# Patient Record
Sex: MALE | Race: WHITE | NOT HISPANIC OR LATINO | URBAN - METROPOLITAN AREA
[De-identification: names, ages, dates, MRNs, and addresses within clinical notes are randomized per-mention and may not be internally consistent; named-entity substitution may affect disease eponyms.]

---

## 2018-06-27 PROBLEM — Z00.00 ENCOUNTER FOR PREVENTIVE HEALTH EXAMINATION: Status: ACTIVE | Noted: 2018-06-27

## 2018-08-20 ENCOUNTER — OUTPATIENT (OUTPATIENT)
Dept: OUTPATIENT SERVICES | Facility: HOSPITAL | Age: 63
LOS: 1 days | End: 2018-08-20
Payer: COMMERCIAL

## 2018-08-20 ENCOUNTER — APPOINTMENT (OUTPATIENT)
Dept: NUCLEAR MEDICINE | Facility: HOSPITAL | Age: 63
End: 2018-08-20
Payer: COMMERCIAL

## 2018-08-20 DIAGNOSIS — C73 MALIGNANT NEOPLASM OF THYROID GLAND: ICD-10-CM

## 2018-08-20 PROCEDURE — 99201 OFFICE OUTPATIENT NEW 10 MINUTES: CPT

## 2018-08-21 ENCOUNTER — APPOINTMENT (OUTPATIENT)
Dept: NUCLEAR MEDICINE | Facility: HOSPITAL | Age: 63
End: 2018-08-21

## 2018-08-22 ENCOUNTER — APPOINTMENT (OUTPATIENT)
Dept: NUCLEAR MEDICINE | Facility: HOSPITAL | Age: 63
End: 2018-08-22

## 2018-08-24 ENCOUNTER — APPOINTMENT (OUTPATIENT)
Dept: NUCLEAR MEDICINE | Facility: HOSPITAL | Age: 63
End: 2018-08-24

## 2018-08-24 PROCEDURE — A9528: CPT

## 2018-08-24 PROCEDURE — 78018 THYROID MET IMAGING BODY: CPT

## 2018-08-24 PROCEDURE — 78999 UNLISTED MISC PX DX NUC MED: CPT

## 2018-08-24 PROCEDURE — 78020 THYROID MET UPTAKE: CPT | Mod: 26

## 2018-08-24 PROCEDURE — 96372 THER/PROPH/DIAG INJ SC/IM: CPT

## 2018-08-24 PROCEDURE — 78803 RP LOCLZJ TUM SPECT 1 AREA: CPT | Mod: 26

## 2018-08-24 PROCEDURE — 78020 THYROID MET UPTAKE: CPT

## 2018-08-24 PROCEDURE — 78018 THYROID MET IMAGING BODY: CPT | Mod: 26

## 2018-08-28 ENCOUNTER — OUTPATIENT (OUTPATIENT)
Dept: OUTPATIENT SERVICES | Facility: HOSPITAL | Age: 63
LOS: 1 days | End: 2018-08-28
Payer: COMMERCIAL

## 2018-08-28 ENCOUNTER — APPOINTMENT (OUTPATIENT)
Dept: NUCLEAR MEDICINE | Facility: HOSPITAL | Age: 63
End: 2018-08-28
Payer: COMMERCIAL

## 2018-08-28 DIAGNOSIS — C73 MALIGNANT NEOPLASM OF THYROID GLAND: ICD-10-CM

## 2018-08-29 ENCOUNTER — APPOINTMENT (OUTPATIENT)
Dept: NUCLEAR MEDICINE | Facility: HOSPITAL | Age: 63
End: 2018-08-29

## 2018-08-30 ENCOUNTER — APPOINTMENT (OUTPATIENT)
Dept: NUCLEAR MEDICINE | Facility: HOSPITAL | Age: 63
End: 2018-08-30

## 2018-08-30 PROCEDURE — 79005 NUCLEAR RX ORAL ADMIN: CPT | Mod: 26

## 2018-09-06 ENCOUNTER — APPOINTMENT (OUTPATIENT)
Dept: NUCLEAR MEDICINE | Facility: HOSPITAL | Age: 63
End: 2018-09-06

## 2018-09-06 PROCEDURE — A9517: CPT

## 2018-09-06 PROCEDURE — 96372 THER/PROPH/DIAG INJ SC/IM: CPT

## 2018-09-06 PROCEDURE — 78018 THYROID MET IMAGING BODY: CPT

## 2018-09-06 PROCEDURE — 79005 NUCLEAR RX ORAL ADMIN: CPT

## 2018-09-06 PROCEDURE — 78018 THYROID MET IMAGING BODY: CPT | Mod: 26

## 2023-08-10 ENCOUNTER — NON-APPOINTMENT (OUTPATIENT)
Age: 68
End: 2023-08-10

## 2023-08-25 ENCOUNTER — APPOINTMENT (OUTPATIENT)
Dept: OTOLARYNGOLOGY | Facility: CLINIC | Age: 68
End: 2023-08-25
Payer: MEDICARE

## 2023-08-25 ENCOUNTER — TRANSCRIPTION ENCOUNTER (OUTPATIENT)
Age: 68
End: 2023-08-25

## 2023-08-25 VITALS
DIASTOLIC BLOOD PRESSURE: 69 MMHG | OXYGEN SATURATION: 95 % | BODY MASS INDEX: 28.98 KG/M2 | HEART RATE: 87 BPM | WEIGHT: 207 LBS | HEIGHT: 71 IN | SYSTOLIC BLOOD PRESSURE: 155 MMHG

## 2023-08-25 DIAGNOSIS — E03.9 HYPOTHYROIDISM, UNSPECIFIED: ICD-10-CM

## 2023-08-25 DIAGNOSIS — Z87.09 PERSONAL HISTORY OF OTHER DISEASES OF THE RESPIRATORY SYSTEM: ICD-10-CM

## 2023-08-25 DIAGNOSIS — Z86.39 PERSONAL HISTORY OF OTHER ENDOCRINE, NUTRITIONAL AND METABOLIC DISEASE: ICD-10-CM

## 2023-08-25 DIAGNOSIS — F17.200 NICOTINE DEPENDENCE, UNSPECIFIED, UNCOMPLICATED: ICD-10-CM

## 2023-08-25 PROCEDURE — 31579 LARYNGOSCOPY TELESCOPIC: CPT

## 2023-08-25 PROCEDURE — 99205 OFFICE O/P NEW HI 60 MIN: CPT | Mod: 25

## 2023-08-25 RX ORDER — METOPROLOL TARTRATE 75 MG/1
TABLET, FILM COATED ORAL
Refills: 0 | Status: ACTIVE | COMMUNITY

## 2023-08-25 RX ORDER — ASPIRIN 81 MG/1
81 TABLET ORAL
Refills: 0 | Status: ACTIVE | COMMUNITY

## 2023-08-25 RX ORDER — ATORVASTATIN CALCIUM 80 MG/1
TABLET, FILM COATED ORAL
Refills: 0 | Status: ACTIVE | COMMUNITY

## 2023-08-25 RX ORDER — LEVOTHYROXINE SODIUM 0.17 MG/1
TABLET ORAL
Refills: 0 | Status: ACTIVE | COMMUNITY

## 2023-08-25 NOTE — HISTORY OF PRESENT ILLNESS
[de-identified] : NORMA LONG is a 68 year old man who presents to the [facility] with difficulty phonating and vocal fold immobility following prior thyroidectomy.  Had total thyroidectomy may 2018. Voice has been hoarse ever since procedure. Dr. Pierre ([facility] [place]) performed vocal cord injection - patient reports she states all medicine fell to bottom of vocal cord and procedure did not work Followed by onc for nodules in chest and neck  CT neck 7/21/23  He now does not note periods of normal voicing - very breathy, has to take a break after every few words He does note specific difficulties with swallowing - has trouble with small foods, gets caught He does not note frequent classic heartburn symptoms. Smoked cigarettes for 40+ years, quit 6 years ago. Smokes cigars and pipe daily   VOCAL DEMANDS: His vocal demands are primarily those of general conversation  Prior Pertinent Procedures: May 2018: Total thyroidectomy 7/1019: In office per-oral injection laryngoplasty with Restylane; Dr. Pierre

## 2023-08-25 NOTE — PROCEDURE
[de-identified] : Stroboscopic Laryngoscopy Procedure Note:  Indication:	Assess laryngeal biomechanics and vocal fold oscillation.  Description of Procedure:	Informed consent was verbally obtained from the patient prior to the procedure. The patient was seated in the clinic chair. Topical anesthesia was achieved by first spraying the nasal cavities with 4% lidocaine and nasal decongestant.   Findings:  Supraglottis: no masses or lesions  Glottis:    Structure:                        Right: crisp and shows no lesions or masses                        Left:  infraglottis mid fullness that appears clear                Mobility:                        Right:  normal                        Left:  immobile and paramedian                Amplitude:                        Right:  normal                       Left:  normal                Closure: complete                 Wave symmetry:  symmetric  Subglottis: no masses or lesions within the visualized subglottis Visualized airway is widely patent.

## 2023-08-25 NOTE — ASSESSMENT
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  We will also request records of prior in office procedure done by Dr. Pierre at [facility] [place].  I have also recommended we proceed to OR for direct laryngoscopy and excision of left vocal fold lesion.  Assuming this did not show cancer, which I have extremely low suspicion of, we would then plan for medialization thyroplasty 2 months afterwards in OR.  The risks, benefits, and alternatives to care were discussed with the patient and understanding expressed.  Alternative options are observation or voice therapy.  Patient is in agreement with this plan.  We will schedule him for first step. I would see him for follow up 1 week and 1 month post op.   Patient will be getting remaining two upper teeth pulled soon.

## 2023-09-07 ENCOUNTER — NON-APPOINTMENT (OUTPATIENT)
Age: 68
End: 2023-09-07

## 2023-09-20 ENCOUNTER — OUTPATIENT (OUTPATIENT)
Dept: OUTPATIENT SERVICES | Facility: HOSPITAL | Age: 68
LOS: 1 days | End: 2023-09-20
Payer: MEDICARE

## 2023-09-20 VITALS
DIASTOLIC BLOOD PRESSURE: 71 MMHG | HEART RATE: 83 BPM | RESPIRATION RATE: 18 BRPM | HEIGHT: 70.5 IN | TEMPERATURE: 98 F | SYSTOLIC BLOOD PRESSURE: 150 MMHG | WEIGHT: 210.98 LBS | OXYGEN SATURATION: 99 %

## 2023-09-20 DIAGNOSIS — Z95.5 PRESENCE OF CORONARY ANGIOPLASTY IMPLANT AND GRAFT: Chronic | ICD-10-CM

## 2023-09-20 DIAGNOSIS — I10 ESSENTIAL (PRIMARY) HYPERTENSION: ICD-10-CM

## 2023-09-20 DIAGNOSIS — Z95.5 PRESENCE OF CORONARY ANGIOPLASTY IMPLANT AND GRAFT: ICD-10-CM

## 2023-09-20 DIAGNOSIS — J38.00 PARALYSIS OF VOCAL CORDS AND LARYNX, UNSPECIFIED: ICD-10-CM

## 2023-09-20 DIAGNOSIS — J38.01 PARALYSIS OF VOCAL CORDS AND LARYNX, UNILATERAL: ICD-10-CM

## 2023-09-20 DIAGNOSIS — E89.0 POSTPROCEDURAL HYPOTHYROIDISM: Chronic | ICD-10-CM

## 2023-09-20 LAB
ALBUMIN SERPL ELPH-MCNC: 4.6 G/DL — SIGNIFICANT CHANGE UP (ref 3.3–5)
ALP SERPL-CCNC: 111 U/L — SIGNIFICANT CHANGE UP (ref 40–120)
ALT FLD-CCNC: 15 U/L — SIGNIFICANT CHANGE UP (ref 4–41)
ANION GAP SERPL CALC-SCNC: 12 MMOL/L — SIGNIFICANT CHANGE UP (ref 7–14)
AST SERPL-CCNC: 17 U/L — SIGNIFICANT CHANGE UP (ref 4–40)
BILIRUB SERPL-MCNC: 0.5 MG/DL — SIGNIFICANT CHANGE UP (ref 0.2–1.2)
BUN SERPL-MCNC: 15 MG/DL — SIGNIFICANT CHANGE UP (ref 7–23)
CALCIUM SERPL-MCNC: 9.9 MG/DL — SIGNIFICANT CHANGE UP (ref 8.4–10.5)
CHLORIDE SERPL-SCNC: 105 MMOL/L — SIGNIFICANT CHANGE UP (ref 98–107)
CO2 SERPL-SCNC: 28 MMOL/L — SIGNIFICANT CHANGE UP (ref 22–31)
CREAT SERPL-MCNC: 0.9 MG/DL — SIGNIFICANT CHANGE UP (ref 0.5–1.3)
EGFR: 92 ML/MIN/1.73M2 — SIGNIFICANT CHANGE UP
GLUCOSE SERPL-MCNC: 84 MG/DL — SIGNIFICANT CHANGE UP (ref 70–99)
HCT VFR BLD CALC: 44.6 % — SIGNIFICANT CHANGE UP (ref 39–50)
HGB BLD-MCNC: 14.8 G/DL — SIGNIFICANT CHANGE UP (ref 13–17)
MCHC RBC-ENTMCNC: 30 PG — SIGNIFICANT CHANGE UP (ref 27–34)
MCHC RBC-ENTMCNC: 33.2 GM/DL — SIGNIFICANT CHANGE UP (ref 32–36)
MCV RBC AUTO: 90.3 FL — SIGNIFICANT CHANGE UP (ref 80–100)
NRBC # BLD: 0 /100 WBCS — SIGNIFICANT CHANGE UP (ref 0–0)
NRBC # FLD: 0 K/UL — SIGNIFICANT CHANGE UP (ref 0–0)
PLATELET # BLD AUTO: 252 K/UL — SIGNIFICANT CHANGE UP (ref 150–400)
POTASSIUM SERPL-MCNC: 4.2 MMOL/L — SIGNIFICANT CHANGE UP (ref 3.5–5.3)
POTASSIUM SERPL-SCNC: 4.2 MMOL/L — SIGNIFICANT CHANGE UP (ref 3.5–5.3)
PROT SERPL-MCNC: 8.3 G/DL — SIGNIFICANT CHANGE UP (ref 6–8.3)
RBC # BLD: 4.94 M/UL — SIGNIFICANT CHANGE UP (ref 4.2–5.8)
RBC # FLD: 13 % — SIGNIFICANT CHANGE UP (ref 10.3–14.5)
SODIUM SERPL-SCNC: 145 MMOL/L — SIGNIFICANT CHANGE UP (ref 135–145)
WBC # BLD: 9.34 K/UL — SIGNIFICANT CHANGE UP (ref 3.8–10.5)
WBC # FLD AUTO: 9.34 K/UL — SIGNIFICANT CHANGE UP (ref 3.8–10.5)

## 2023-09-20 PROCEDURE — 93010 ELECTROCARDIOGRAM REPORT: CPT

## 2023-09-20 RX ORDER — SODIUM CHLORIDE 9 MG/ML
1000 INJECTION, SOLUTION INTRAVENOUS
Refills: 0 | Status: DISCONTINUED | OUTPATIENT
Start: 2023-10-04 | End: 2023-10-18

## 2023-09-20 NOTE — H&P PST ADULT - NSICDXPASTMEDICALHX_GEN_ALL_CORE_FT
PAST MEDICAL HISTORY:  COPD (chronic obstructive pulmonary disease)     Hyperlipidemia     Thyroid cancer     Vocal cord paralysis      PAST MEDICAL HISTORY:  COPD (chronic obstructive pulmonary disease)     Hyperlipidemia     Murmur     Thyroid cancer     Vocal cord paralysis

## 2023-09-20 NOTE — H&P PST ADULT - NSICDXPASTSURGICALHX_GEN_ALL_CORE_FT
PAST SURGICAL HISTORY:  H/O thyroidectomy      PAST SURGICAL HISTORY:  H/O thyroidectomy     Stented coronary artery

## 2023-09-20 NOTE — H&P PST ADULT - MUSCULOSKELETAL
negative normal/ROM intact/normal gait/strength 5/5 bilateral upper extremities/strength 5/5 bilateral lower extremities Doxycycline Counseling:  Patient counseled regarding possible photosensitivity and increased risk for sunburn.  Patient instructed to avoid sunlight, if possible.  When exposed to sunlight, patients should wear protective clothing, sunglasses, and sunscreen.  The patient was instructed to call the office immediately if the following severe adverse effects occur:  hearing changes, easy bruising/bleeding, severe headache, or vision changes.  The patient verbalized understanding of the proper use and possible adverse effects of doxycycline.  All of the patient's questions and concerns were addressed.

## 2023-09-20 NOTE — H&P PST ADULT - PROBLEM SELECTOR PLAN 1
Pt. is scheduled for a direct laryngoscopy, right and or left microflap excision of vocal fold lesion, larngeal steroid injection 10/4/23.  Pt. verbalized understanding of instructions.

## 2023-09-20 NOTE — H&P PST ADULT - PROBLEM SELECTOR PLAN 3
Pt. is scheduled for medical clearance as requested by the Surgeon.  Requested it be faxed to PST due to pt's. elevated BP.

## 2023-09-20 NOTE — H&P PST ADULT - NSANTHOSAYNRD_GEN_A_CORE
No. ZANDRA screening performed.  STOP BANG Legend: 0-2 = LOW Risk; 3-4 = INTERMEDIATE Risk; 5-8 = HIGH Risk

## 2023-09-20 NOTE — H&P PST ADULT - PROBLEM SELECTOR PROBLEM 2
Caller: Vikki Durham    Relationship to patient: Self    Best call back number: [phone]    Chief complaint: R/S    Type of visit: LAB FOLLOW UP     Requested date: 12-6, 12-9 AFTER 1:00 OR WEEK OF 12-12    If rescheduling, when is the original appointment: 11-30    Additional notes: PLEASE ADVISE         
Stented coronary artery

## 2023-09-20 NOTE — H&P PST ADULT - PROBLEM SELECTOR PLAN 2
Pt. instructed to continue baby ASA due to coronary stent.  Pt. sees his Cardiologist annually.  Confirmed with office last visit 12/2022.  Requested last visit note, ECHO, stress, and cath results to be faxed to PST.

## 2023-10-03 ENCOUNTER — TRANSCRIPTION ENCOUNTER (OUTPATIENT)
Age: 68
End: 2023-10-03

## 2023-10-03 VITALS
HEIGHT: 70.47 IN | OXYGEN SATURATION: 94 % | DIASTOLIC BLOOD PRESSURE: 49 MMHG | HEART RATE: 77 BPM | TEMPERATURE: 98 F | WEIGHT: 210.98 LBS | RESPIRATION RATE: 16 BRPM | SYSTOLIC BLOOD PRESSURE: 139 MMHG

## 2023-10-03 RX ORDER — ONDANSETRON 8 MG/1
4 TABLET, FILM COATED ORAL ONCE
Refills: 0 | Status: DISCONTINUED | OUTPATIENT
Start: 2023-10-04 | End: 2023-10-18

## 2023-10-03 RX ORDER — OXYCODONE HYDROCHLORIDE 5 MG/1
5 TABLET ORAL ONCE
Refills: 0 | Status: DISCONTINUED | OUTPATIENT
Start: 2023-10-04 | End: 2023-10-04

## 2023-10-03 RX ORDER — FENTANYL CITRATE 50 UG/ML
50 INJECTION INTRAVENOUS
Refills: 0 | Status: DISCONTINUED | OUTPATIENT
Start: 2023-10-04 | End: 2023-10-04

## 2023-10-03 NOTE — ASU PATIENT PROFILE, ADULT - NSICDXPASTMEDICALHX_GEN_ALL_CORE_FT
PAST MEDICAL HISTORY:  COPD (chronic obstructive pulmonary disease)     Hyperlipidemia     Murmur     Thyroid cancer     Vocal cord paralysis

## 2023-10-03 NOTE — ASU PATIENT PROFILE, ADULT - FALL HARM RISK - UNIVERSAL INTERVENTIONS
Bed in lowest position, wheels locked, appropriate side rails in place/Call bell, personal items and telephone in reach/Instruct patient to call for assistance before getting out of bed or chair/Non-slip footwear when patient is out of bed/[place] to call system/Physically safe environment - no spills, clutter or unnecessary equipment/Purposeful Proactive Rounding/Room/bathroom lighting operational, light cord in reach

## 2023-10-04 ENCOUNTER — APPOINTMENT (OUTPATIENT)
Dept: OTOLARYNGOLOGY | Facility: HOSPITAL | Age: 68
End: 2023-10-04
Payer: MEDICARE

## 2023-10-04 ENCOUNTER — TRANSCRIPTION ENCOUNTER (OUTPATIENT)
Age: 68
End: 2023-10-04

## 2023-10-04 ENCOUNTER — OUTPATIENT (OUTPATIENT)
Dept: OUTPATIENT SERVICES | Facility: HOSPITAL | Age: 68
LOS: 1 days | Discharge: HOME | End: 2023-10-04
Payer: MEDICARE

## 2023-10-04 VITALS
HEART RATE: 81 BPM | RESPIRATION RATE: 18 BRPM | TEMPERATURE: 98 F | OXYGEN SATURATION: 100 % | SYSTOLIC BLOOD PRESSURE: 119 MMHG | DIASTOLIC BLOOD PRESSURE: 63 MMHG

## 2023-10-04 DIAGNOSIS — E89.0 POSTPROCEDURAL HYPOTHYROIDISM: Chronic | ICD-10-CM

## 2023-10-04 DIAGNOSIS — J38.01 PARALYSIS OF VOCAL CORDS AND LARYNX, UNILATERAL: ICD-10-CM

## 2023-10-04 DIAGNOSIS — Z95.5 PRESENCE OF CORONARY ANGIOPLASTY IMPLANT AND GRAFT: Chronic | ICD-10-CM

## 2023-10-04 PROCEDURE — 31541 LARYNSCOP W/TUMR EXC + SCOPE: CPT

## 2023-10-04 PROCEDURE — 31571 LARYNGOSCOP W/VC INJ + SCOPE: CPT

## 2023-10-04 PROCEDURE — ZZZZZ: CPT

## 2023-10-04 RX ORDER — FLUTICASONE PROPIONATE 220 MCG
440 AEROSOL WITH ADAPTER (GRAM) INHALATION
Qty: 1 | Refills: 0
Start: 2023-10-04 | End: 2023-11-02

## 2023-10-04 NOTE — ASU DISCHARGE PLAN (ADULT/PEDIATRIC) - ASU DC SPECIAL INSTRUCTIONSFT
Use flovent inhaler (220mcg), 2 puffs 2 times/day Use flovent inhaler (220mcg), 2 puffs 2 times/day  Voice rest x5 days

## 2023-10-04 NOTE — ASU DISCHARGE PLAN (ADULT/PEDIATRIC) - NS MD DC FALL RISK RISK
For information on Fall & Injury Prevention, visit: [email or web address] OR  [email or web address] OR  https://www.cdc.gov/steadi/patient.html

## 2023-10-04 NOTE — BRIEF OPERATIVE NOTE - NSICDXBRIEFPROCEDURE_GEN_ALL_CORE_FT
PROCEDURES:  Laryngoscopy, direct, with vocal cord lesion excision 04-Oct-2023 08:46:54  Sheryl Allen

## 2023-10-04 NOTE — ASU PREOP CHECKLIST - NS PREOP CHK HIBICLENS NA
Afebrile, vitals stable. Talked about doing bath and oral cares, which he has not been doing. Agreed and did take a bath this evening. Continue to encourage activity.   N/A

## 2023-10-06 PROBLEM — E78.5 HYPERLIPIDEMIA, UNSPECIFIED: Chronic | Status: ACTIVE | Noted: 2023-09-20

## 2023-10-06 PROBLEM — J38.00 PARALYSIS OF VOCAL CORDS AND LARYNX, UNSPECIFIED: Chronic | Status: ACTIVE | Noted: 2023-09-20

## 2023-10-06 PROBLEM — J44.9 CHRONIC OBSTRUCTIVE PULMONARY DISEASE, UNSPECIFIED: Chronic | Status: ACTIVE | Noted: 2023-09-20

## 2023-10-06 PROBLEM — R01.1 CARDIAC MURMUR, UNSPECIFIED: Chronic | Status: ACTIVE | Noted: 2023-09-20

## 2023-10-06 PROBLEM — C73 MALIGNANT NEOPLASM OF THYROID GLAND: Chronic | Status: ACTIVE | Noted: 2023-09-20

## 2023-10-12 ENCOUNTER — APPOINTMENT (OUTPATIENT)
Dept: OTOLARYNGOLOGY | Facility: CLINIC | Age: 68
End: 2023-10-12

## 2023-11-08 ENCOUNTER — NON-APPOINTMENT (OUTPATIENT)
Age: 68
End: 2023-11-08

## 2023-11-09 ENCOUNTER — APPOINTMENT (OUTPATIENT)
Dept: OTOLARYNGOLOGY | Facility: CLINIC | Age: 68
End: 2023-11-09
Payer: MEDICARE

## 2023-11-09 VITALS
HEART RATE: 79 BPM | DIASTOLIC BLOOD PRESSURE: 71 MMHG | WEIGHT: 207 LBS | BODY MASS INDEX: 28.98 KG/M2 | HEIGHT: 71 IN | RESPIRATION RATE: 18 BRPM | OXYGEN SATURATION: 94 % | SYSTOLIC BLOOD PRESSURE: 132 MMHG

## 2023-11-09 DIAGNOSIS — J38.3 OTHER DISEASES OF VOCAL CORDS: ICD-10-CM

## 2023-11-09 PROCEDURE — 31579 LARYNGOSCOPY TELESCOPIC: CPT

## 2023-11-09 PROCEDURE — 99213 OFFICE O/P EST LOW 20 MIN: CPT | Mod: 25

## 2023-11-09 RX ORDER — BECLOMETHASONE DIPROPIONATE HFA 80 UG/1
80 AEROSOL, METERED RESPIRATORY (INHALATION) TWICE DAILY
Qty: 1 | Refills: 5 | Status: COMPLETED | COMMUNITY
Start: 2023-10-04 | End: 2023-11-09

## 2023-11-09 RX ORDER — BUDESONIDE, GLYCOPYRROLATE, AND FORMOTEROL FUMARATE 160; 9; 4.8 UG/1; UG/1; UG/1
AEROSOL, METERED RESPIRATORY (INHALATION)
Refills: 0 | Status: ACTIVE | COMMUNITY

## 2023-11-09 RX ORDER — FLUTICASONE FUROATE, UMECLIDINIUM BROMIDE AND VILANTEROL TRIFENATATE 100; 62.5; 25 UG/1; UG/1; UG/1
100-62.5-25 POWDER RESPIRATORY (INHALATION)
Refills: 0 | Status: COMPLETED | COMMUNITY
End: 2023-11-09

## 2023-11-12 PROBLEM — J38.3 LESION OF VOCAL FOLD: Status: RESOLVED | Noted: 2023-08-25 | Resolved: 2023-11-12

## 2023-11-30 ENCOUNTER — OUTPATIENT (OUTPATIENT)
Dept: OUTPATIENT SERVICES | Facility: HOSPITAL | Age: 68
LOS: 1 days | End: 2023-11-30
Payer: MEDICARE

## 2023-11-30 VITALS
HEART RATE: 61 BPM | DIASTOLIC BLOOD PRESSURE: 81 MMHG | SYSTOLIC BLOOD PRESSURE: 155 MMHG | OXYGEN SATURATION: 96 % | RESPIRATION RATE: 16 BRPM | HEIGHT: 71 IN | TEMPERATURE: 98 F | WEIGHT: 205.91 LBS

## 2023-11-30 DIAGNOSIS — E89.0 POSTPROCEDURAL HYPOTHYROIDISM: Chronic | ICD-10-CM

## 2023-11-30 DIAGNOSIS — E03.9 HYPOTHYROIDISM, UNSPECIFIED: ICD-10-CM

## 2023-11-30 DIAGNOSIS — J38.00 PARALYSIS OF VOCAL CORDS AND LARYNX, UNSPECIFIED: ICD-10-CM

## 2023-11-30 DIAGNOSIS — J38.01 PARALYSIS OF VOCAL CORDS AND LARYNX, UNILATERAL: ICD-10-CM

## 2023-11-30 DIAGNOSIS — Z95.5 PRESENCE OF CORONARY ANGIOPLASTY IMPLANT AND GRAFT: ICD-10-CM

## 2023-11-30 DIAGNOSIS — Z95.5 PRESENCE OF CORONARY ANGIOPLASTY IMPLANT AND GRAFT: Chronic | ICD-10-CM

## 2023-11-30 LAB
ALBUMIN SERPL ELPH-MCNC: 4.2 G/DL — SIGNIFICANT CHANGE UP (ref 3.3–5)
ALBUMIN SERPL ELPH-MCNC: 4.2 G/DL — SIGNIFICANT CHANGE UP (ref 3.3–5)
ALP SERPL-CCNC: 106 U/L — SIGNIFICANT CHANGE UP (ref 40–120)
ALP SERPL-CCNC: 106 U/L — SIGNIFICANT CHANGE UP (ref 40–120)
ALT FLD-CCNC: 17 U/L — SIGNIFICANT CHANGE UP (ref 4–41)
ALT FLD-CCNC: 17 U/L — SIGNIFICANT CHANGE UP (ref 4–41)
ANION GAP SERPL CALC-SCNC: 11 MMOL/L — SIGNIFICANT CHANGE UP (ref 7–14)
ANION GAP SERPL CALC-SCNC: 11 MMOL/L — SIGNIFICANT CHANGE UP (ref 7–14)
AST SERPL-CCNC: 17 U/L — SIGNIFICANT CHANGE UP (ref 4–40)
AST SERPL-CCNC: 17 U/L — SIGNIFICANT CHANGE UP (ref 4–40)
BILIRUB SERPL-MCNC: 0.6 MG/DL — SIGNIFICANT CHANGE UP (ref 0.2–1.2)
BILIRUB SERPL-MCNC: 0.6 MG/DL — SIGNIFICANT CHANGE UP (ref 0.2–1.2)
BLD GP AB SCN SERPL QL: POSITIVE — SIGNIFICANT CHANGE UP
BLD GP AB SCN SERPL QL: POSITIVE — SIGNIFICANT CHANGE UP
BUN SERPL-MCNC: 16 MG/DL — SIGNIFICANT CHANGE UP (ref 7–23)
BUN SERPL-MCNC: 16 MG/DL — SIGNIFICANT CHANGE UP (ref 7–23)
CALCIUM SERPL-MCNC: 9.4 MG/DL — SIGNIFICANT CHANGE UP (ref 8.4–10.5)
CALCIUM SERPL-MCNC: 9.4 MG/DL — SIGNIFICANT CHANGE UP (ref 8.4–10.5)
CHLORIDE SERPL-SCNC: 105 MMOL/L — SIGNIFICANT CHANGE UP (ref 98–107)
CHLORIDE SERPL-SCNC: 105 MMOL/L — SIGNIFICANT CHANGE UP (ref 98–107)
CO2 SERPL-SCNC: 25 MMOL/L — SIGNIFICANT CHANGE UP (ref 22–31)
CO2 SERPL-SCNC: 25 MMOL/L — SIGNIFICANT CHANGE UP (ref 22–31)
CREAT SERPL-MCNC: 0.89 MG/DL — SIGNIFICANT CHANGE UP (ref 0.5–1.3)
CREAT SERPL-MCNC: 0.89 MG/DL — SIGNIFICANT CHANGE UP (ref 0.5–1.3)
EGFR: 92 ML/MIN/1.73M2 — SIGNIFICANT CHANGE UP
EGFR: 92 ML/MIN/1.73M2 — SIGNIFICANT CHANGE UP
GLUCOSE SERPL-MCNC: 83 MG/DL — SIGNIFICANT CHANGE UP (ref 70–99)
GLUCOSE SERPL-MCNC: 83 MG/DL — SIGNIFICANT CHANGE UP (ref 70–99)
HCT VFR BLD CALC: 41.6 % — SIGNIFICANT CHANGE UP (ref 39–50)
HCT VFR BLD CALC: 41.6 % — SIGNIFICANT CHANGE UP (ref 39–50)
HGB BLD-MCNC: 13.6 G/DL — SIGNIFICANT CHANGE UP (ref 13–17)
HGB BLD-MCNC: 13.6 G/DL — SIGNIFICANT CHANGE UP (ref 13–17)
MCHC RBC-ENTMCNC: 29.3 PG — SIGNIFICANT CHANGE UP (ref 27–34)
MCHC RBC-ENTMCNC: 29.3 PG — SIGNIFICANT CHANGE UP (ref 27–34)
MCHC RBC-ENTMCNC: 32.7 GM/DL — SIGNIFICANT CHANGE UP (ref 32–36)
MCHC RBC-ENTMCNC: 32.7 GM/DL — SIGNIFICANT CHANGE UP (ref 32–36)
MCV RBC AUTO: 89.7 FL — SIGNIFICANT CHANGE UP (ref 80–100)
MCV RBC AUTO: 89.7 FL — SIGNIFICANT CHANGE UP (ref 80–100)
NRBC # BLD: 0 /100 WBCS — SIGNIFICANT CHANGE UP (ref 0–0)
NRBC # BLD: 0 /100 WBCS — SIGNIFICANT CHANGE UP (ref 0–0)
NRBC # FLD: 0 K/UL — SIGNIFICANT CHANGE UP (ref 0–0)
NRBC # FLD: 0 K/UL — SIGNIFICANT CHANGE UP (ref 0–0)
PLATELET # BLD AUTO: 223 K/UL — SIGNIFICANT CHANGE UP (ref 150–400)
PLATELET # BLD AUTO: 223 K/UL — SIGNIFICANT CHANGE UP (ref 150–400)
POTASSIUM SERPL-MCNC: 3.8 MMOL/L — SIGNIFICANT CHANGE UP (ref 3.5–5.3)
POTASSIUM SERPL-MCNC: 3.8 MMOL/L — SIGNIFICANT CHANGE UP (ref 3.5–5.3)
POTASSIUM SERPL-SCNC: 3.8 MMOL/L — SIGNIFICANT CHANGE UP (ref 3.5–5.3)
POTASSIUM SERPL-SCNC: 3.8 MMOL/L — SIGNIFICANT CHANGE UP (ref 3.5–5.3)
PROT SERPL-MCNC: 7.5 G/DL — SIGNIFICANT CHANGE UP (ref 6–8.3)
PROT SERPL-MCNC: 7.5 G/DL — SIGNIFICANT CHANGE UP (ref 6–8.3)
RBC # BLD: 4.64 M/UL — SIGNIFICANT CHANGE UP (ref 4.2–5.8)
RBC # BLD: 4.64 M/UL — SIGNIFICANT CHANGE UP (ref 4.2–5.8)
RBC # FLD: 12.8 % — SIGNIFICANT CHANGE UP (ref 10.3–14.5)
RBC # FLD: 12.8 % — SIGNIFICANT CHANGE UP (ref 10.3–14.5)
RH IG SCN BLD-IMP: POSITIVE — SIGNIFICANT CHANGE UP
SODIUM SERPL-SCNC: 141 MMOL/L — SIGNIFICANT CHANGE UP (ref 135–145)
SODIUM SERPL-SCNC: 141 MMOL/L — SIGNIFICANT CHANGE UP (ref 135–145)
WBC # BLD: 8.88 K/UL — SIGNIFICANT CHANGE UP (ref 3.8–10.5)
WBC # BLD: 8.88 K/UL — SIGNIFICANT CHANGE UP (ref 3.8–10.5)
WBC # FLD AUTO: 8.88 K/UL — SIGNIFICANT CHANGE UP (ref 3.8–10.5)
WBC # FLD AUTO: 8.88 K/UL — SIGNIFICANT CHANGE UP (ref 3.8–10.5)

## 2023-11-30 PROCEDURE — 86077 PHYS BLOOD BANK SERV XMATCH: CPT

## 2023-11-30 RX ORDER — SODIUM CHLORIDE 9 MG/ML
1000 INJECTION, SOLUTION INTRAVENOUS
Refills: 0 | Status: DISCONTINUED | OUTPATIENT
Start: 2023-12-06 | End: 2023-12-20

## 2023-11-30 NOTE — H&P PST ADULT - HISTORY OF PRESENT ILLNESS
Pt. is a 69 yo male with vocal cord paralysis after total thyroidectomy for thyroid CA at [facility] [place]. Patient underwent s/p laryngoscopy, right and or left microflap excision of vocal fold lesion, larngeal steroid injection on 10/4/23; however, that not successful,  now patient is scheduled for  left sided medialization thyroplasty with silicone implant , flexible laryngoscopy  Pt. is a 67 yo male with vocal cord paralysis [place] [facility] [place] Patient underwent s/p laryngoscopy, right and or left microflap excision of vocal fold lesion, larngeal steroid injection on 10/4/23; however, that not successful,  now patient is scheduled for  left sided medialization thyroplasty with silicone implant , flexible laryngoscopy  Pt. is a 69 yo male with vocal cord paralysis after total thyroidectomy for thyroid CA at [facility] [place]. Patient underwent s/p laryngoscopy, right and or left microflap excision of vocal fold lesion, larngeal steroid injection on 10/4/23; however, that not successful,  now patient is scheduled for  left sided medialization thyroplasty with silicone implant , flexible laryngoscopy

## 2023-11-30 NOTE — H&P PST ADULT - OTHER CARE PROVIDERS
Dr. Dash, Cardiologist [phone] Dr. Dash, Cardiologist [phone] Dr. Dash, Cardiologist [phone]

## 2023-11-30 NOTE — H&P PST ADULT - PROBLEM SELECTOR PLAN 1
Patient tentatively scheduled for left sided medialization thyroplasty with silicone implant , flexible laryngoscopy       Pre-op instructions provided. Pt given verbal and written instructions with teach back on Pepcid. Pt verbalized understanding with return demonstration.

## 2023-11-30 NOTE — H&P PST ADULT - NECK
FULL ROM of Neck/normal/supple/symmetric/no tracheal deviation/no palpable masses Dupixent Pregnancy And Lactation Text: This medication likely crosses the placenta but the risk for the fetus is uncertain. This medication is excreted in breast milk.

## 2023-12-05 ENCOUNTER — TRANSCRIPTION ENCOUNTER (OUTPATIENT)
Age: 68
End: 2023-12-05

## 2023-12-05 NOTE — ASU PATIENT PROFILE, ADULT - FALL HARM RISK - UNIVERSAL INTERVENTIONS
Bed in lowest position, wheels locked, appropriate side rails in place/Call bell, personal items and telephone in reach/Non-slip footwear when patient is out of bed/[place] to call system/Physically safe environment - no spills, clutter or unnecessary equipment/Purposeful Proactive Rounding/Room/bathroom lighting operational, light cord in reach Bed in lowest position, wheels locked, appropriate side rails in place/Call bell, personal items and telephone in reach/Non-slip footwear when patient is out of bed/[place] to call system/Physically safe environment - no spills, clutter or unnecessary equipment/Purposeful Proactive Rounding/Room/bathroom lighting operational, light cord in reach

## 2023-12-06 ENCOUNTER — APPOINTMENT (OUTPATIENT)
Dept: OTOLARYNGOLOGY | Facility: HOSPITAL | Age: 68
End: 2023-12-06
Payer: MEDICARE

## 2023-12-06 ENCOUNTER — OUTPATIENT (OUTPATIENT)
Dept: OUTPATIENT SERVICES | Facility: HOSPITAL | Age: 68
LOS: 1 days | Discharge: HOME | End: 2023-12-06
Payer: MEDICARE

## 2023-12-06 ENCOUNTER — TRANSCRIPTION ENCOUNTER (OUTPATIENT)
Age: 68
End: 2023-12-06

## 2023-12-06 VITALS
DIASTOLIC BLOOD PRESSURE: 68 MMHG | RESPIRATION RATE: 16 BRPM | HEIGHT: 71 IN | TEMPERATURE: 98 F | OXYGEN SATURATION: 97 % | HEART RATE: 55 BPM | SYSTOLIC BLOOD PRESSURE: 145 MMHG | WEIGHT: 205.91 LBS

## 2023-12-06 VITALS
TEMPERATURE: 98 F | DIASTOLIC BLOOD PRESSURE: 61 MMHG | HEART RATE: 77 BPM | OXYGEN SATURATION: 98 % | RESPIRATION RATE: 18 BRPM | SYSTOLIC BLOOD PRESSURE: 153 MMHG

## 2023-12-06 DIAGNOSIS — J38.01 PARALYSIS OF VOCAL CORDS AND LARYNX, UNILATERAL: ICD-10-CM

## 2023-12-06 DIAGNOSIS — E89.0 POSTPROCEDURAL HYPOTHYROIDISM: Chronic | ICD-10-CM

## 2023-12-06 DIAGNOSIS — Z95.5 PRESENCE OF CORONARY ANGIOPLASTY IMPLANT AND GRAFT: Chronic | ICD-10-CM

## 2023-12-06 PROCEDURE — 31591 LARYNGOPLASTY MEDIALIZATION: CPT

## 2023-12-06 PROCEDURE — ZZZZZ: CPT

## 2023-12-06 DEVICE — IMPLANTABLE DEVICE: Type: IMPLANTABLE DEVICE | Site: LEFT | Status: FUNCTIONAL

## 2023-12-06 RX ORDER — METOPROLOL TARTRATE 50 MG
1 TABLET ORAL
Refills: 0 | DISCHARGE

## 2023-12-06 RX ORDER — OXYCODONE AND ACETAMINOPHEN 5; 325 MG/1; MG/1
1 TABLET ORAL
Qty: 5 | Refills: 0
Start: 2023-12-06

## 2023-12-06 RX ORDER — ATORVASTATIN CALCIUM 80 MG/1
1 TABLET, FILM COATED ORAL
Refills: 0 | DISCHARGE

## 2023-12-06 RX ORDER — ASPIRIN/CALCIUM CARB/MAGNESIUM 324 MG
1 TABLET ORAL
Refills: 0 | DISCHARGE

## 2023-12-06 RX ORDER — ONDANSETRON 8 MG/1
4 TABLET, FILM COATED ORAL ONCE
Refills: 0 | Status: DISCONTINUED | OUTPATIENT
Start: 2023-12-06 | End: 2023-12-20

## 2023-12-06 RX ORDER — FENTANYL CITRATE 50 UG/ML
50 INJECTION INTRAVENOUS
Refills: 0 | Status: DISCONTINUED | OUTPATIENT
Start: 2023-12-06 | End: 2023-12-06

## 2023-12-06 RX ORDER — LEVOTHYROXINE SODIUM 125 MCG
1 TABLET ORAL
Refills: 0 | DISCHARGE

## 2023-12-06 RX ORDER — FENTANYL CITRATE 50 UG/ML
25 INJECTION INTRAVENOUS
Refills: 0 | Status: DISCONTINUED | OUTPATIENT
Start: 2023-12-06 | End: 2023-12-06

## 2023-12-06 NOTE — ASU DISCHARGE PLAN (ADULT/PEDIATRIC) - NS MD DC FALL RISK RISK
For information on Fall & Injury Prevention, visit: [email or web address]/[email or web address] OR  [email or web address].[email or web address]/[email or web address]-[email or web address]-[email or web address]-injury OR  https://www.cdc.gov/steadi/patient.html For information on Fall & Injury Prevention, visit: [email or web address]://www.[email or web address]/[email or web address]-[email or web address]-[email or web address]-mobility OR  [email or web address].[facility]/[email or web address]-tips-[email or web address]-avoid-injury OR  https://www.cdc.gov/steadi/patient.html

## 2023-12-06 NOTE — ASU DISCHARGE PLAN (ADULT/PEDIATRIC) - ASU DC SPECIAL INSTRUCTIONSFT
Dressing Care: Keep dressing in place for 48 hours. After 48 hours, you may remove the clear sticker and the piece of gauze underneath. You may then shower and letwater run down the incision, do not scrub. Leave the white tape on your neck, this will fall off on its own. If it doesn't come off within 1 week, you may remove it yourself. After this, you may cover your incision with bacitracin once a day. The sutures are dissolvable.     Pain: Take tylenol and motrin for pain as needed. You were prescribed percocets for severe pain. Take as needed along with a stool softener.     Medications:  - Take medrol dosepak for 5 days - follow instructions on the package.   - Please do not resume aspirin for 1 week (until 12/13/23)    Follow up: You have follow up scheduled with Dr. Kinney. If you have any questions do not hesitate to call. Dressing Care: Keep dressing in place for 48 hours. After 48 hours, you may remove the clear sticker and the piece of gauze underneath. You may then shower and let water run down the incision, do not scrub. Leave the white tape on your neck, this will fall off on its own. If it doesn't come off within 1 week, you may remove it yourself. After this, you may cover your incision with bacitracin once a day. The sutures are dissolvable.     Pain: Take Tylenol and Motrin for pain as needed. You were prescribed percocet for severe pain. Take as needed along with a stool softener.     Medications:  - Take medrol dosepak for 5 days - follow instructions on the package.   - Please do not resume aspirin for 1 week (until 12/13/23)    Follow up: You have follow up scheduled with Dr. Kinney. If you have any questions do not hesitate to call.

## 2023-12-06 NOTE — ASU PREOP CHECKLIST - COMMENTS
pt took lopressor, synthroid, famotidine,atorvastatin and the breztri inhaler at 5 am with a sip of water

## 2024-01-03 ENCOUNTER — NON-APPOINTMENT (OUTPATIENT)
Age: 69
End: 2024-01-03

## 2024-01-04 ENCOUNTER — APPOINTMENT (OUTPATIENT)
Dept: OTOLARYNGOLOGY | Facility: CLINIC | Age: 69
End: 2024-01-04
Payer: MEDICARE

## 2024-01-04 VITALS
HEART RATE: 76 BPM | DIASTOLIC BLOOD PRESSURE: 69 MMHG | SYSTOLIC BLOOD PRESSURE: 125 MMHG | WEIGHT: 207 LBS | HEIGHT: 71 IN | BODY MASS INDEX: 28.98 KG/M2

## 2024-01-04 PROCEDURE — 31579 LARYNGOSCOPY TELESCOPIC: CPT | Mod: 78

## 2024-01-04 PROCEDURE — 99212 OFFICE O/P EST SF 10 MIN: CPT | Mod: 25,24

## 2024-01-04 NOTE — PHYSICAL EXAM
[de-identified] : Well healed incision [Normal] : lingual tonsils are normal [de-identified] : Scattered white exudate on soft palate.

## 2024-01-04 NOTE — PROCEDURE
[de-identified] : Stroboscopic Laryngoscopy Procedure Note:  Indication:	Assess laryngeal biomechanics and vocal fold oscillation.  Description of Procedure:	Informed consent was verbally obtained from the patient prior to the procedure. The patient was seated in the clinic chair. Topical anesthesia was achieved by first spraying the nasal cavities with 4% lidocaine and nasal decongestant.   Findings:  Supraglottis: no masses or lesions  Glottis:    Structure:                        Right: crisp and shows no lesions or masses                        Left:  crisp and shows no lesions or masses                 Mobility:                        Right:  normal                        Left:  normal                Amplitude:                        Right:  normal                       Left:  normal                Closure: small posterior glottic gap                Wave symmetry:  symmetric  Subglottis: no masses or lesions within the visualized subglottis Visualized airway is widely patent.

## 2024-01-04 NOTE — ASSESSMENT
[FreeTextEntry1] : Assessment/Plan: #1 Dysphonia #2 Left sided vocal fold immobility s/p medialization thyroplasty #3 Fungal laryngitis and oral thrush  Encouraged patient to make sure to rinse out mouth after using inhaler.  Reports he will start doing that.  I have started him on fluconazole 200mg daily for 2 weeks to treat this.  The risks, benefits, and alternatives to care were discussed with the patient and understanding expressed.  Patient was offered voice therapy to get even closer to 100% normal voice but declined as he is very happy with sound of voice currently.  As such I will follow up with him in 6 months.   Total time: 15 minutes; total time does not include time spent performing the procedure and its related elements. It does include all other face to face and non face to face pre and post visit tasks performed on the same date of service.

## 2024-01-04 NOTE — HISTORY OF PRESENT ILLNESS
[de-identified] : NORMA LONG is a 68 year old male who presents to the [facility] for follow up of his dysphonia and left sided vocal fold immobility s/p medialization. I last saw the patient on 12/6/23. This is his 1st POA - Patient states doing well since procedure - says voice is "much better." States can speak without choking, coughing or having difficulty breathing. States voice was deeper, now can speak in higher tone and pitch. Denies pain/discomfort. No dysphagia, odynophagia, hemoptysis, bleeding, recent fevers or chills. Reports voice was 20% of normal prior to surgery and is now 90% of normal.  Previously reported: difficulty phonating and vocal fold immobility following prior thyroidectomy. Had total thyroidectomy may 2018. Voice has been hoarse ever since procedure. Dr. Pierre ([facility] [place]) performed vocal cord injection - patient reports she states all medicine fell to bottom of vocal cord and procedure did not work. He now does not note periods of normal voicing - very breathy, has to take a break after every few words He does note specific difficulties with swallowing - has trouble with small foods, gets caught He does not note frequent classic heartburn symptoms. Smoked cigarettes for 40+ years, quit 6 years ago. Smokes cigars and pipe daily VOCAL DEMANDS: His vocal demands are primarily those of general conversation  Prior Pertinent Procedures: May 2018: Total thyroidectomy 7/10/19: In office per-oral injection laryngoplasty with Restylane; Dr. Pierre 10/4/23: DL, excision of left vocal fold retained Restylane; Dr. Kinney 12/6/23: Left sided medialization thyroplasty with Silastic; Dr. Kinney

## 2024-01-04 NOTE — ASU PREOP CHECKLIST - AS TEMP SITE
----- Message from Ana Wyatt MD sent at 1/3/2024  6:38 PM CST -----  Normal liver enzyme  
Call to pt.  Detailed message left.  
oral

## 2024-05-28 ENCOUNTER — APPOINTMENT (OUTPATIENT)
Dept: OTOLARYNGOLOGY | Facility: CLINIC | Age: 69
End: 2024-05-28
Payer: MEDICARE

## 2024-05-28 VITALS
OXYGEN SATURATION: 96 % | DIASTOLIC BLOOD PRESSURE: 82 MMHG | HEART RATE: 57 BPM | HEIGHT: 71 IN | WEIGHT: 195 LBS | SYSTOLIC BLOOD PRESSURE: 138 MMHG | RESPIRATION RATE: 17 BRPM | BODY MASS INDEX: 27.3 KG/M2

## 2024-05-28 DIAGNOSIS — B37.89 OTHER SITES OF CANDIDIASIS: ICD-10-CM

## 2024-05-28 PROCEDURE — 99214 OFFICE O/P EST MOD 30 MIN: CPT | Mod: 25

## 2024-05-28 PROCEDURE — 31579 LARYNGOSCOPY TELESCOPIC: CPT

## 2024-05-28 RX ORDER — METOPROLOL SUCCINATE 25 MG/1
25 TABLET, EXTENDED RELEASE ORAL
Qty: 90 | Refills: 0 | Status: ACTIVE | COMMUNITY
Start: 2024-05-03

## 2024-05-28 RX ORDER — ALBUTEROL SULFATE 90 UG/1
108 (90 BASE) INHALANT RESPIRATORY (INHALATION)
Qty: 8 | Refills: 0 | Status: ACTIVE | COMMUNITY
Start: 2023-11-29

## 2024-05-28 RX ORDER — FLUCONAZOLE 200 MG/1
200 TABLET ORAL
Qty: 14 | Refills: 0 | Status: COMPLETED | COMMUNITY
Start: 2024-01-04 | End: 2024-05-28

## 2024-05-28 RX ORDER — FLUCONAZOLE 200 MG/1
200 TABLET ORAL
Qty: 14 | Refills: 0 | Status: ACTIVE | COMMUNITY
Start: 2024-05-28 | End: 1900-01-01

## 2024-05-28 RX ORDER — ATORVASTATIN CALCIUM 40 MG/1
40 TABLET, FILM COATED ORAL
Qty: 90 | Refills: 0 | Status: ACTIVE | COMMUNITY
Start: 2023-11-02

## 2024-05-28 NOTE — ASSESSMENT
[FreeTextEntry1] : Assessment/Plan: #1 Dysphonia #2 Left sided vocal fold immobility s/p medialization thyroplasty #3 Fungal laryngitis and oral thrush  We will do fluconazole 200mg daily for 2 weeks.  Should he get another fungus infection in the next 1 year we would treat again and the unlikely put him on clotrimazole to prevent another infection. Again offered voice therapy but declined. I would like to see him back in 2 weeks.

## 2024-05-28 NOTE — PROCEDURE
[de-identified] : Stroboscopic Laryngoscopy Procedure Note:  Indication:	Assess laryngeal biomechanics and vocal fold oscillation.  Description of Procedure:	Informed consent was verbally obtained from the patient prior to the procedure. The patient was seated in the clinic chair. Topical anesthesia was achieved by first spraying the nasal cavities with 4% lidocaine and nasal decongestant.   Findings:  Supraglottis: no masses or lesions  Glottis:    Structure:                        Right: white exudate throughout                       Left:  white exudate throughout                 Mobility:                        Right:  normal                        Left:  absent, almost midline                 Amplitude:                        Right:  normal                       Left:  normal                Closure: posterior glottic gap                Wave symmetry:  symmetric  Subglottis: no masses or lesions within the visualized subglottis Visualized airway is widely patent.

## 2024-05-28 NOTE — HISTORY OF PRESENT ILLNESS
[de-identified] : NORMA LONG is a 68 year old male who presents to the [facility] for follow up of his dysphonia, left sided vocal fold immobility s/p medialization thyroplasty, hx of fungal laryngitis. I last saw him on 1/4/24. At that time I started him on fluconazole and offered voice therapy but he declined.  Recommended follow up in 6 months. States since surgery on 12/06/23 he was doing great but now has voice hoarseness for a month. States voice is cracking, breathy and raspy. Reports increased wheezing and constant mild cough (cough has been present for many years) Had pneumonia last month and was antibiotics.  Smoked cigarettes for 40+ years, quit 6 years ago. Smoked cigars and pipes but recently quit a month ago.  Patient denies dysphagia, odynophagia, dyspnea, throat pain, neck swelling, neck pain, hemoptysis, recent infections or fevers   Previously reported: difficulty phonating and vocal fold immobility following prior thyroidectomy. Had total thyroidectomy may 2018. Voice has been hoarse ever since procedure. Dr. Pierre ([facility] [place] performed vocal cord injection - patient reports she states all medicine fell to bottom of vocal cord and procedure did not work. He now does not note periods of normal voicing - very breathy, has to take a break after every few words He does note specific difficulties with swallowing - has trouble with small foods, gets caught He does not note frequent classic heartburn symptoms. Smoked cigarettes for 40+ years, quit 6 years ago. Smokes cigars and pipe daily VOCAL DEMANDS: His vocal demands are primarily those of general conversation  Prior Pertinent Procedures: May 2018: Total thyroidectomy 7/10/19: In office per-oral injection laryngoplasty with Restylane; Dr. Pierre 10/4/23: DL, excision of left vocal fold retained Restylane; Dr. Kinney 12/6/23: Left sided medialization thyroplasty with Silastic; Dr. Kinney

## 2024-06-10 ENCOUNTER — APPOINTMENT (OUTPATIENT)
Dept: OTOLARYNGOLOGY | Facility: CLINIC | Age: 69
End: 2024-06-10
Payer: MEDICARE

## 2024-06-10 VITALS
HEIGHT: 71 IN | BODY MASS INDEX: 27.3 KG/M2 | WEIGHT: 195 LBS | SYSTOLIC BLOOD PRESSURE: 149 MMHG | HEART RATE: 66 BPM | DIASTOLIC BLOOD PRESSURE: 94 MMHG

## 2024-06-10 DIAGNOSIS — R49.0 DYSPHONIA: ICD-10-CM

## 2024-06-10 DIAGNOSIS — B37.0 CANDIDAL STOMATITIS: ICD-10-CM

## 2024-06-10 DIAGNOSIS — Z98.890 OTHER SPECIFIED POSTPROCEDURAL STATES: ICD-10-CM

## 2024-06-10 DIAGNOSIS — J38.01 PARALYSIS OF VOCAL CORDS AND LARYNX, UNILATERAL: ICD-10-CM

## 2024-06-10 PROCEDURE — 31579 LARYNGOSCOPY TELESCOPIC: CPT

## 2024-06-10 PROCEDURE — 99214 OFFICE O/P EST MOD 30 MIN: CPT | Mod: 25

## 2024-06-10 RX ORDER — CLOTRIMAZOLE 10 MG/1
10 LOZENGE ORAL
Qty: 44 | Refills: 2 | Status: ACTIVE | COMMUNITY
Start: 2024-06-10 | End: 1900-01-01

## 2024-06-10 NOTE — ASSESSMENT
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  I believe that his muscle tension dysphonia is playing a large role in why his voice has deteriorated recently as well as his COPD with recent PNA.  However to check position of thyroplasty implant I have recommended we get a CT neck w/o contrast.  I will call him with results of this.  He should see me back upon completion of voice therapy or if not making improvement in voice after a couple sessions.

## 2024-06-10 NOTE — HISTORY OF PRESENT ILLNESS
[de-identified] : NORMA LONG is a 68 year old male who presents to the [facility] for follow up of his dysphonia, left sided vocal fold immobility s/p medialization thyroplasty, hx of fungal laryngitis. I last saw him on 5/28/24. At that time I restarted him on fluconazole and offered voice therapy but he declined. Recommended follow up in 2 weeks. Will finish fluconazole tomorrow. No changes to voice since last visit. No longer smoking.   Previously reported: difficulty phonating and vocal fold immobility following prior thyroidectomy. Had total thyroidectomy may 2018. Voice has been hoarse ever since procedure. Dr. Pierre ([facility] [place] performed vocal cord injection - patient reports she states all medicine fell to bottom of vocal cord and procedure did not work. He now does not note periods of normal voicing - very breathy, has to take a break after every few words He does note specific difficulties with swallowing - has trouble with small foods, gets caught He does not note frequent classic heartburn symptoms. Smoked cigarettes for 40+ years, quit 6 years ago. Smokes cigars and pipe daily VOCAL DEMANDS: His vocal demands are primarily those of general conversation  Prior Pertinent Procedures: May 2018: Total thyroidectomy 7/10/19: In office per-oral injection laryngoplasty with Restylane; Dr. Pierre 10/4/23: DL, excision of left vocal fold retained Restylane; Dr. Kinney 12/6/23: Left sided medialization thyroplasty with Silastic; Dr. Kinney

## 2024-06-10 NOTE — PROCEDURE
[de-identified] : Stroboscopic Laryngoscopy Procedure Note:  Indication:	Assess laryngeal biomechanics and vocal fold oscillation.  Description of Procedure:	Informed consent was verbally obtained from the patient prior to the procedure. The patient was seated in the clinic chair. Topical anesthesia was achieved by first spraying the nasal cavities with 4% lidocaine and nasal decongestant.   Findings:  Supraglottis: no masses or lesions  Glottis:    Structure:                        Right: crisp and shows no lesions or masses                        Left:  crisp and shows no lesions or masses                 Mobility:                        Right:  normal                        Left:  absent, midline                Amplitude:                        Right:  normal                       Left:  normal                Closure: irregular, anterior and posterior glottic gaps depending on frequency                Wave symmetry:  asymmetric  Subglottis: no masses or lesions within the visualized subglottis Visualized airway is widely patent. Other: severe lateral supraglottic squeeze

## 2024-06-15 ENCOUNTER — APPOINTMENT (OUTPATIENT)
Dept: CT IMAGING | Facility: CLINIC | Age: 69
End: 2024-06-15

## 2024-06-15 PROCEDURE — 70490 CT SOFT TISSUE NECK W/O DYE: CPT

## 2024-06-17 DIAGNOSIS — R91.8 OTHER NONSPECIFIC ABNORMAL FINDING OF LUNG FIELD: ICD-10-CM

## 2024-06-25 ENCOUNTER — APPOINTMENT (OUTPATIENT)
Dept: OTOLARYNGOLOGY | Facility: CLINIC | Age: 69
End: 2024-06-25

## 2024-07-03 ENCOUNTER — APPOINTMENT (OUTPATIENT)
Dept: OTOLARYNGOLOGY | Facility: CLINIC | Age: 69
End: 2024-07-03
Payer: MEDICARE

## 2024-07-03 PROCEDURE — 92524 BEHAVRAL QUALIT ANALYS VOICE: CPT | Mod: GN

## 2024-07-03 PROCEDURE — 92520 LARYNGEAL FUNCTION STUDIES: CPT | Mod: GN

## 2024-07-11 ENCOUNTER — APPOINTMENT (OUTPATIENT)
Dept: OTOLARYNGOLOGY | Facility: CLINIC | Age: 69
End: 2024-07-11
Payer: MEDICARE

## 2024-07-11 PROCEDURE — 92507 TX SP LANG VOICE COMM INDIV: CPT | Mod: GN

## 2024-07-18 ENCOUNTER — APPOINTMENT (OUTPATIENT)
Dept: OTOLARYNGOLOGY | Facility: CLINIC | Age: 69
End: 2024-07-18
Payer: MEDICARE

## 2024-07-18 PROCEDURE — 92507 TX SP LANG VOICE COMM INDIV: CPT | Mod: GN

## 2024-07-25 ENCOUNTER — APPOINTMENT (OUTPATIENT)
Dept: OTOLARYNGOLOGY | Facility: CLINIC | Age: 69
End: 2024-07-25

## 2024-07-26 ENCOUNTER — APPOINTMENT (OUTPATIENT)
Dept: OTOLARYNGOLOGY | Facility: CLINIC | Age: 69
End: 2024-07-26

## 2024-07-26 PROCEDURE — 92507 TX SP LANG VOICE COMM INDIV: CPT | Mod: GN

## 2024-08-01 ENCOUNTER — APPOINTMENT (OUTPATIENT)
Dept: OTOLARYNGOLOGY | Facility: CLINIC | Age: 69
End: 2024-08-01
Payer: MEDICARE

## 2024-08-01 PROCEDURE — 92507 TX SP LANG VOICE COMM INDIV: CPT | Mod: GN

## 2024-08-08 ENCOUNTER — APPOINTMENT (OUTPATIENT)
Dept: OTOLARYNGOLOGY | Facility: CLINIC | Age: 69
End: 2024-08-08

## 2024-08-08 PROCEDURE — 92507 TX SP LANG VOICE COMM INDIV: CPT | Mod: GN

## 2024-08-15 ENCOUNTER — APPOINTMENT (OUTPATIENT)
Dept: OTOLARYNGOLOGY | Facility: CLINIC | Age: 69
End: 2024-08-15
Payer: MEDICARE

## 2024-08-15 PROCEDURE — 92507 TX SP LANG VOICE COMM INDIV: CPT | Mod: GN

## 2024-08-22 ENCOUNTER — APPOINTMENT (OUTPATIENT)
Dept: OTOLARYNGOLOGY | Facility: CLINIC | Age: 69
End: 2024-08-22

## 2024-09-06 ENCOUNTER — APPOINTMENT (OUTPATIENT)
Dept: OTOLARYNGOLOGY | Facility: CLINIC | Age: 69
End: 2024-09-06

## 2024-09-23 ENCOUNTER — APPOINTMENT (OUTPATIENT)
Dept: OTOLARYNGOLOGY | Facility: CLINIC | Age: 69
End: 2024-09-23
Payer: MEDICARE

## 2024-09-23 VITALS
TEMPERATURE: 98.2 F | HEART RATE: 72 BPM | BODY MASS INDEX: 28 KG/M2 | SYSTOLIC BLOOD PRESSURE: 153 MMHG | OXYGEN SATURATION: 95 % | WEIGHT: 200 LBS | DIASTOLIC BLOOD PRESSURE: 79 MMHG | HEIGHT: 71 IN

## 2024-09-23 DIAGNOSIS — J38.01 PARALYSIS OF VOCAL CORDS AND LARYNX, UNILATERAL: ICD-10-CM

## 2024-09-23 DIAGNOSIS — R49.0 DYSPHONIA: ICD-10-CM

## 2024-09-23 PROCEDURE — 99214 OFFICE O/P EST MOD 30 MIN: CPT | Mod: 25

## 2024-09-23 PROCEDURE — 31579 LARYNGOSCOPY TELESCOPIC: CPT

## 2024-09-23 NOTE — PROCEDURE
[de-identified] : Stroboscopic Laryngoscopy Procedure Note:  Indication:	Assess laryngeal biomechanics and vocal fold oscillation.  Description of Procedure:	Informed consent was verbally obtained from the patient prior to the procedure. The patient was seated in the clinic chair. Topical anesthesia was achieved by first spraying the nasal cavities with 4% lidocaine and nasal decongestant.   Findings:  Supraglottis: no masses or lesions  Glottis:    Structure:                        Right: crisp and shows no lesions or masses                        Left:  crisp and shows no lesions or masses                 Mobility:                        Right:  normal                        Left:  absent, near midline                Amplitude:                        Right:  normal                       Left:  normal                Closure: incomplete                 Wave symmetry:  asymmetric  Subglottis: no masses or lesions within the visualized subglottis Visualized airway is widely patent.

## 2024-09-23 NOTE — PROCEDURE
[de-identified] : Stroboscopic Laryngoscopy Procedure Note:  Indication:	Assess laryngeal biomechanics and vocal fold oscillation.  Description of Procedure:	Informed consent was verbally obtained from the patient prior to the procedure. The patient was seated in the clinic chair. Topical anesthesia was achieved by first spraying the nasal cavities with 4% lidocaine and nasal decongestant.   Findings:  Supraglottis: no masses or lesions  Glottis:    Structure:                        Right: crisp and shows no lesions or masses                        Left:  crisp and shows no lesions or masses                 Mobility:                        Right:  normal                        Left:  absent, near midline                Amplitude:                        Right:  normal                       Left:  normal                Closure: incomplete                 Wave symmetry:  asymmetric  Subglottis: no masses or lesions within the visualized subglottis Visualized airway is widely patent.

## 2024-09-23 NOTE — HISTORY OF PRESENT ILLNESS
[de-identified] : NORMA LONG is a 68 year old male who presents to the [facility] for follow up of his h/o recurrent fungal laryngitis and oral thrush, dysphonia, left sided vocal fold immobility s/p medialization thyroplasty. I last saw him on 6/19/24. At that time I have recommended we start clotrimazole bid for 2 weeks and then daily for 3 months. I believed that his muscle tension dysphonia was playing a large role in why his voice had deteriorated recently as well as his COPD with recent PNA. However to check position of thyroplasty implant I recommended we get a CT neck w/o contrast. He was to see me back upon completion of voice therapy or if not making improvement in voice after a couple sessions. Has since finished working with Jess. Does not feel voice is any improved since finishing voice therapy. Voice is better when hes exercising but becomes very hoarse about 30 minutes after finishing. No longer taking abx. Gets out of breath when he speaks. No trouble swallowing.  CT neck w/o con performed on 6/15/24 and reviewed by myself shows: Left sided thyroplasty implant appears to be in appropriate position.  Previously reported: difficulty phonating and vocal fold immobility following prior thyroidectomy. Had total thyroidectomy may 2018. Voice has been hoarse ever since procedure. [place] [place] - patient reports she states all medicine fell to bottom of vocal cord and procedure did not work. He now does not note periods of normal voicing - very breathy, has to take a break after every few words He does note specific difficulties with swallowing - has trouble with small foods, gets caught He does not note frequent classic heartburn symptoms. Smoked cigarettes for 40+ years, quit 6 years ago. Smokes cigars and pipe daily VOCAL DEMANDS: His vocal demands are primarily those of general conversation  Prior Pertinent Procedures: May 2018: Total thyroidectomy 7/10/19: In office per-oral injection laryngoplasty with Restylane; Dr. Pierre 10/4/23: DL, excision of left vocal fold retained Restylane; Dr. Kinney 12/6/23: Left sided medialization thyroplasty with Silastic; Dr. Kinney

## 2024-09-23 NOTE — ASSESSMENT
[FreeTextEntry1] : Assessment/Plan: #1 Dysphonia #2 Left sided vocal fold immobility s/p medialization thyroplasty #3 H/o recurrent fungal laryngitis and oral thrush #4 History of thyroid cancer  I do not have a good excuse as to why patients voice went from good to basically what it was before surgery however at least part of this could be accounted for by post op swelling and as such have offered revision thyroplasty with larger implant.  The risks, benefits, and alternatives to care were discussed with the patient and understanding expressed.  Will need medical clearance. I will see back approx 2 weeks post op. Will not plan for any further voice therapy.  Also discussed option of injection laryngoplasty.

## 2024-09-23 NOTE — HISTORY OF PRESENT ILLNESS
[de-identified] : NORMA LONG is a 68 year old male who presents to the [facility] for follow up of his h/o recurrent fungal laryngitis and oral thrush, dysphonia, left sided vocal fold immobility s/p medialization thyroplasty. I last saw him on 6/19/24. At that time I have recommended we start clotrimazole bid for 2 weeks and then daily for 3 months. I believed that his muscle tension dysphonia was playing a large role in why his voice had deteriorated recently as well as his COPD with recent PNA. However to check position of thyroplasty implant I recommended we get a CT neck w/o contrast. He was to see me back upon completion of voice therapy or if not making improvement in voice after a couple sessions. Has since finished working with Jess. Does not feel voice is any improved since finishing voice therapy. Voice is better when hes exercising but becomes very hoarse about 30 minutes after finishing. No longer taking abx. Gets out of breath when he speaks. No trouble swallowing.  CT neck w/o con performed on 6/15/24 and reviewed by myself shows: Left sided thyroplasty implant appears to be in appropriate position.  Previously reported: difficulty phonating and vocal fold immobility following prior thyroidectomy. Had total thyroidectomy may 2018. Voice has been hoarse ever since procedure. Dr. Pierre ([facility]) performed vocal cord injection - patient reports she states all medicine fell to bottom of vocal cord and procedure did not work. He now does not note periods of normal voicing - very breathy, has to take a break after every few words He does note specific difficulties with swallowing - has trouble with small foods, gets caught He does not note frequent classic heartburn symptoms. Smoked cigarettes for 40+ years, quit 6 years ago. Smokes cigars and pipe daily VOCAL DEMANDS: His vocal demands are primarily those of general conversation  Prior Pertinent Procedures: May 2018: Total thyroidectomy 7/10/19: In office per-oral injection laryngoplasty with Restylane; Dr. Pierre 10/4/23: DL, excision of left vocal fold retained Restylane; Dr. Kinney 12/6/23: Left sided medialization thyroplasty with Silastic; Dr. Kinney

## 2024-11-20 ENCOUNTER — APPOINTMENT (OUTPATIENT)
Dept: OTOLARYNGOLOGY | Facility: HOSPITAL | Age: 69
End: 2024-11-20

## 2024-12-12 ENCOUNTER — APPOINTMENT (OUTPATIENT)
Dept: OTOLARYNGOLOGY | Facility: CLINIC | Age: 69
End: 2024-12-12

## 2025-04-09 NOTE — H&P PST ADULT - NSICDXFAMILYHX_GEN_ALL_CORE_FT
FAMILY HISTORY:  No pertinent family history in first degree relatives Patient states no history FAMILY HISTORY:  No pertinent family history in first degree relatives

## (undated) DEVICE — POSITIONER PATIENT SAFETY STRAP 3X60"

## (undated) DEVICE — SYR ASEPTO

## (undated) DEVICE — BASIN SET DOUBLE

## (undated) DEVICE — DRAPE 3/4 SHEET 52X76"

## (undated) DEVICE — STAPLER SKIN VISI-STAT 35 WIDE

## (undated) DEVICE — WARMING BLANKET LOWER ADULT

## (undated) DEVICE — DRSG TELFA 3 X 8

## (undated) DEVICE — BIPOLAR FORCEP KIRWAN JEWELERS STR 4" X 0.4MM W 12FT CORD (GREEN)

## (undated) DEVICE — DRSG CURITY GAUZE SPONGE 4 X 4" 12-PLY

## (undated) DEVICE — SYR LUER LOK 3CC

## (undated) DEVICE — Device

## (undated) DEVICE — GLV 7.5 PROTEXIS (CREAM) MICRO

## (undated) DEVICE — TUBING RAPIDVAC SMOKE EVACUATOR .25" X 10FT

## (undated) DEVICE — BUR ANSPACH BALL FLUTED EXT 3MM

## (undated) DEVICE — TUBING SUCTION NONCONDUCTIVE 6MM X 12FT

## (undated) DEVICE — LABELS BLANK W PEN

## (undated) DEVICE — SOL ANTI FOG

## (undated) DEVICE — LONE STAR RETRACTOR RING 12MM BLUNT DISP

## (undated) DEVICE — KNIFE BLITZER LARYNGEAL DISP

## (undated) DEVICE — DRAPE SHOWER CURTAIN ISOLATION

## (undated) DEVICE — NDL INJ RIGID 24G 16X25